# Patient Record
Sex: FEMALE | Race: WHITE | NOT HISPANIC OR LATINO | Employment: UNEMPLOYED | URBAN - METROPOLITAN AREA
[De-identification: names, ages, dates, MRNs, and addresses within clinical notes are randomized per-mention and may not be internally consistent; named-entity substitution may affect disease eponyms.]

---

## 2024-08-13 ENCOUNTER — HOSPITAL ENCOUNTER (EMERGENCY)
Facility: HOSPITAL | Age: 19
Discharge: HOME | End: 2024-08-13
Payer: MEDICAID

## 2024-08-13 VITALS
BODY MASS INDEX: 36.28 KG/M2 | WEIGHT: 212.52 LBS | SYSTOLIC BLOOD PRESSURE: 116 MMHG | DIASTOLIC BLOOD PRESSURE: 71 MMHG | RESPIRATION RATE: 20 BRPM | TEMPERATURE: 98.5 F | HEART RATE: 81 BPM | OXYGEN SATURATION: 98 % | HEIGHT: 64 IN

## 2024-08-13 DIAGNOSIS — H93.8X2 SENSATION OF FULLNESS IN LEFT EAR: Primary | ICD-10-CM

## 2024-08-13 PROCEDURE — 99282 EMERGENCY DEPT VISIT SF MDM: CPT

## 2024-08-13 NOTE — ED PROVIDER NOTES
Subjective   History of Present Illness  Patient is a 20-year-old white female who presents today with complaints of fullness in the left ear and difficulty hearing out of the left ear.  She states she noticed it a few days ago.  It is gotten worse.  She states she did irrigate the ear yesterday and was able to get some wax out of it but reports continued symptoms this morning she presents for evaluation.  She denies any pain.      Review of Systems   Constitutional:  Negative for fever.   HENT:  Positive for hearing loss. Negative for ear discharge and ear pain.        Past Medical History:   Diagnosis Date    Asthma        No Known Allergies    No past surgical history on file.    No family history on file.    Social History     Socioeconomic History    Marital status: Single   Tobacco Use    Smoking status: Never    Smokeless tobacco: Never   Vaping Use    Vaping status: Never Used   Substance and Sexual Activity    Alcohol use: Never    Drug use: Never    Sexual activity: Defer           Objective   Physical Exam  Vital signs and triage nurse note reviewed.  Constitutional: Awake, alert; well-developed and well-nourished. No acute distress is noted.  HEENT: Normocephalic, atraumatic; pupils are PERRL with intact EOM; oropharynx is pink and moist without exudate or erythema.  No drooling or pooling of oral secretions.  Left canal is clear and appears within normal is.  Left TM appears within normal limits, no evidence of infection.  TM is intact.  No effusion noted.  Neck: Supple, full range of motion without pain; no cervical lymphadenopathy. Normal phonation.  Neuro: Alert oriented x3, speech is clear and appropriate, GCS 15.    Skin: Flesh tone, warm, dry, intact; no erythematous or petechial rash or lesion.    Procedures           ED Course                                             Medical Decision Making  Patient presents today with the above complaint.    She had the above exam and evaluation.  Left TM  and canal appear within norm limits at this time.  Findings were discussed with her.  She is instructed follow-up with ENT.  Was given warning signs for prompt return to ED and voiced understanding.    Problems Addressed:  Sensation of fullness in left ear: acute illness or injury        Final diagnoses:   Sensation of fullness in left ear       ED Disposition  ED Disposition       ED Disposition   Discharge    Condition   Stable    Comment   --               [facility]  [address]  [facility] [address]  [phone]             Medication List      No changes were made to your prescriptions during this visit.            Marimar Mcmanus, APRN  08/13/24 0958

## 2024-09-05 ENCOUNTER — HOSPITAL ENCOUNTER (EMERGENCY)
Facility: HOSPITAL | Age: 19
Discharge: HOME | End: 2024-09-05
Payer: MEDICAID

## 2024-09-05 VITALS
HEIGHT: 64 IN | DIASTOLIC BLOOD PRESSURE: 78 MMHG | HEART RATE: 73 BPM | OXYGEN SATURATION: 98 % | RESPIRATION RATE: 18 BRPM | WEIGHT: 210.1 LBS | TEMPERATURE: 98.9 F | BODY MASS INDEX: 35.87 KG/M2 | SYSTOLIC BLOOD PRESSURE: 112 MMHG

## 2024-09-05 DIAGNOSIS — H60.502 ACUTE OTITIS EXTERNA OF LEFT EAR, UNSPECIFIED TYPE: Primary | ICD-10-CM

## 2024-09-05 PROCEDURE — 99282 EMERGENCY DEPT VISIT SF MDM: CPT

## 2024-09-05 RX ORDER — NEOMYCIN SULFATE, POLYMYXIN B SULFATE, HYDROCORTISONE 3.5; 10000; 1 MG/ML; [USP'U]/ML; MG/ML
3 SOLUTION/ DROPS AURICULAR (OTIC) 4 TIMES DAILY
Qty: 10 ML | Refills: 0 | Status: SHIPPED | OUTPATIENT
Start: 2024-09-05

## 2024-09-05 NOTE — DISCHARGE INSTRUCTIONS
Use drops as prescribed.  May use Tylenol or ibuprofen for discomfort.  Keep the water out of your ear.  Follow-up with primary care provider and/or ENT if her symptoms do not improve.  Return for new or worsening symptoms.

## 2024-09-05 NOTE — ED PROVIDER NOTES
Subjective   History of Present Illness  Patient is a 19-year-old white female with no significant medical history presents today with complaints of painful sore left ear.  She states that started 2 days ago.  No known injury or trauma.  States she has had some drainage from the left ear.  She denies any fever.  No congestion      Review of Systems   Constitutional:  Negative for fever.   HENT:  Positive for ear discharge and ear pain. Negative for congestion and sore throat.    Respiratory:  Negative for cough.        Past Medical History:   Diagnosis Date    Asthma        No Known Allergies    No past surgical history on file.    No family history on file.    Social History     Socioeconomic History    Marital status:    Tobacco Use    Smoking status: Never    Smokeless tobacco: Never   Vaping Use    Vaping status: Never Used   Substance and Sexual Activity    Alcohol use: Never    Drug use: Never    Sexual activity: Defer           Objective   Physical Exam  Vital signs and triage nurse note reviewed.  Constitutional: Awake, alert; well-developed and well-nourished. No acute distress is noted.  HEENT: Normocephalic, atraumatic; pupils are PERRL with intact EOM; oropharynx is pink and moist without exudate or erythema.  No drooling or pooling of oral secretions.  Right TM and canal appear normal.  The left TM is intact and appears within normal limits however the left canal is erythematous, swollen with some scant drainage.  Neck: Supple, full range of motion without pain; no cervical lymphadenopathy. Normal phonation.  Cardiovascular: Regular rate and rhythm, normal S1-S2.  No murmur noted.  Pulmonary: Respiratory effort regular nonlabored, breath sounds clear to auscultation all fields.    Procedures           ED Course                                             Medical Decision Making  Patient presents today with the above complaint.    She had above exam and evaluation that reveals a left otitis  externa.  She is otherwise well-appearing, afebrile hemodynamically stable.    Findings were discussed with her.  She will be discharged home with prescription for Cortisporin and instructed follow-up with ENT or primary care provider.  She was given warning signs for prompt return to ED voiced understanding.        Final diagnoses:   Acute otitis externa of left ear, unspecified type       ED Disposition  ED Disposition       ED Disposition   Discharge    Condition   Stable    Comment   --               PATIENT CONNECTION - [facility]  [facility] [address]  [phone]    As needed    [facility]  [address]  [facility] [address]  [phone]    As needed         Medication List        New Prescriptions      neomycin-polymyxin-hydrocortisone 3.5-10530-3 otic solution  Commonly known as: CORTISPORIN  Administer 3 drops into the left ear 4 (Four) Times a Day.               Where to Get Your Medications        These medications were sent to [id number] - [place], [place] - [address] - [phone]  - [phone] FX  [address] [facility] [place] [place] [address]      Phone: [phone]   neomycin-polymyxin-hydrocortisone 3.5-14823-3 otic solution            Marimar Mcmanus APRN  09/05/24 4738

## 2025-02-16 ENCOUNTER — HOSPITAL ENCOUNTER (EMERGENCY)
Facility: HOSPITAL | Age: 20
Discharge: HOME | End: 2025-02-16
Admitting: EMERGENCY MEDICINE
Payer: MEDICAID

## 2025-02-16 ENCOUNTER — APPOINTMENT (OUTPATIENT)
Dept: GENERAL RADIOLOGY | Facility: HOSPITAL | Age: 20
End: 2025-02-16
Payer: MEDICAID

## 2025-02-16 VITALS
HEIGHT: 64 IN | TEMPERATURE: 98.2 F | BODY MASS INDEX: 34.78 KG/M2 | OXYGEN SATURATION: 97 % | RESPIRATION RATE: 18 BRPM | SYSTOLIC BLOOD PRESSURE: 122 MMHG | HEART RATE: 67 BPM | WEIGHT: 203.71 LBS | DIASTOLIC BLOOD PRESSURE: 59 MMHG

## 2025-02-16 DIAGNOSIS — M25.561 ACUTE PAIN OF RIGHT KNEE: Primary | ICD-10-CM
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
## 2025-02-16 PROCEDURE — 73562 X-RAY EXAM OF KNEE 3: CPT

## 2025-02-16 PROCEDURE — 99283 EMERGENCY DEPT VISIT LOW MDM: CPT
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
## 2025-02-16 NOTE — DISCHARGE INSTRUCTIONS
Rest.  Drink plenty of fluids.  Activities as tolerated.  May use Ace wrap for comfort.  Use ice 20 minutes at a time several times a day as needed for discomfort, do not place ice directly on your skin.  Patient connection has been given to you to establish a primary care provider, call Monday to schedule an appointment.  Return to the ER for any new or worsening symptoms.